# Patient Record
Sex: FEMALE | URBAN - NONMETROPOLITAN AREA
[De-identification: names, ages, dates, MRNs, and addresses within clinical notes are randomized per-mention and may not be internally consistent; named-entity substitution may affect disease eponyms.]

---

## 2018-11-20 ENCOUNTER — APPOINTMENT (RX ONLY)
Dept: URBAN - NONMETROPOLITAN AREA CLINIC 4 | Facility: CLINIC | Age: 63
Setting detail: DERMATOLOGY
End: 2018-11-20

## 2018-11-20 DIAGNOSIS — D22 MELANOCYTIC NEVI: ICD-10-CM

## 2018-11-20 DIAGNOSIS — D485 NEOPLASM OF UNCERTAIN BEHAVIOR OF SKIN: ICD-10-CM

## 2018-11-20 DIAGNOSIS — D18.0 HEMANGIOMA: ICD-10-CM

## 2018-11-20 DIAGNOSIS — B07.8 OTHER VIRAL WARTS: ICD-10-CM

## 2018-11-20 DIAGNOSIS — L81.4 OTHER MELANIN HYPERPIGMENTATION: ICD-10-CM

## 2018-11-20 DIAGNOSIS — L73.8 OTHER SPECIFIED FOLLICULAR DISORDERS: ICD-10-CM

## 2018-11-20 PROBLEM — I10 ESSENTIAL (PRIMARY) HYPERTENSION: Status: ACTIVE | Noted: 2018-11-20

## 2018-11-20 PROBLEM — D18.01 HEMANGIOMA OF SKIN AND SUBCUTANEOUS TISSUE: Status: ACTIVE | Noted: 2018-11-20

## 2018-11-20 PROBLEM — D48.5 NEOPLASM OF UNCERTAIN BEHAVIOR OF SKIN: Status: ACTIVE | Noted: 2018-11-20

## 2018-11-20 PROBLEM — D22.9 MELANOCYTIC NEVI, UNSPECIFIED: Status: ACTIVE | Noted: 2018-11-20

## 2018-11-20 PROBLEM — I25.10 ATHEROSCLEROTIC HEART DISEASE OF NATIVE CORONARY ARTERY WITHOUT ANGINA PECTORIS: Status: ACTIVE | Noted: 2018-11-20

## 2018-11-20 PROBLEM — M12.9 ARTHROPATHY, UNSPECIFIED: Status: ACTIVE | Noted: 2018-11-20

## 2018-11-20 PROCEDURE — 11100: CPT

## 2018-11-20 PROCEDURE — ? BIOPSY BY SHAVE METHOD

## 2018-11-20 PROCEDURE — ? COUNSELING

## 2018-11-20 PROCEDURE — 11101: CPT

## 2018-11-20 PROCEDURE — 99203 OFFICE O/P NEW LOW 30 MIN: CPT | Mod: 25

## 2018-11-20 ASSESSMENT — LOCATION SIMPLE DESCRIPTION DERM
LOCATION SIMPLE: RIGHT POSTERIOR THIGH
LOCATION SIMPLE: LEFT CHEEK
LOCATION SIMPLE: RIGHT POSTERIOR THIGH
LOCATION SIMPLE: LEFT CHEEK

## 2018-11-20 ASSESSMENT — LOCATION DETAILED DESCRIPTION DERM
LOCATION DETAILED: LEFT SUPERIOR MEDIAL BUCCAL CHEEK
LOCATION DETAILED: LEFT SUPERIOR MEDIAL BUCCAL CHEEK
LOCATION DETAILED: RIGHT PROXIMAL POSTERIOR THIGH
LOCATION DETAILED: RIGHT PROXIMAL POSTERIOR THIGH

## 2018-11-20 ASSESSMENT — LOCATION ZONE DERM
LOCATION ZONE: LEG
LOCATION ZONE: FACE
LOCATION ZONE: LEG
LOCATION ZONE: FACE

## 2018-11-20 NOTE — PROCEDURE: MIPS QUALITY
Quality 110: Preventive Care And Screening: Influenza Immunization: Influenza Immunization Administered during Influenza season
Quality 111:Pneumonia Vaccination Status For Older Adults: Pneumococcal Vaccination not Administered or Previously Received, Reason not Otherwise Specified
Quality 431: Preventive Care And Screening: Unhealthy Alcohol Use - Screening: Patient screened for unhealthy alcohol use using a single question and scores 2 or greater episodes per year and brief intervention did not occur
Quality 130: Documentation Of Current Medications In The Medical Record: Current Medications Documented
Detail Level: Detailed
Quality 226: Preventive Care And Screening: Tobacco Use: Screening And Cessation Intervention: Patient screened for tobacco use and is an ex/non-smoker

## 2018-11-20 NOTE — PROCEDURE: BIOPSY BY SHAVE METHOD
X Size Of Lesion In Cm: 0
Dressing: bandage
Body Location Override (Optional - Billing Will Still Be Based On Selected Body Map Location If Applicable): left nasolabial
Anesthesia Volume In Cc (Will Not Render If 0): 0.5
Render Post-Care Instructions In Note?: yes
Wound Care: Bacitracin
Silver Nitrate Text: The wound bed was treated with silver nitrate after the biopsy was performed.
Electrodesiccation And Curettage Text: The wound bed was treated with electrodesiccation and curettage after the biopsy was performed.
Hemostasis: Electrocautery
Consent: Written consent was obtained and risks were reviewed including but not limited to scarring, infection, bleeding, scabbing, incomplete removal, nerve damage and allergy to anesthesia.
Cryotherapy Text: The wound bed was treated with cryotherapy after the biopsy was performed.
Electrodesiccation Text: The wound bed was treated with electrodesiccation after the biopsy was performed.
Curettage Text: The wound bed was treated with curettage after the biopsy was performed.
Size Of Lesion In Cm: 0.6
Post-Care Instructions: I reviewed with the patient in detail post-care instructions. Patient is to keep the biopsy site dry overnight, and then apply bacitracin twice daily until healed. Patient may apply hydrogen peroxide soaks to remove any crusting.
Bill 20705 For Specimen Handling/Conveyance To Laboratory?: no
Biopsy Type: H and E
Detail Level: Detailed
Type Of Destruction Used: Curettage
Billing Type: [employer]
Biopsy Method: Dermablade
Lab Facility: [address] [place] [address]
Lab: [id number]
Depth Of Biopsy: dermis
Notification Instructions: Patient will be notified of biopsy results. However, patient instructed to call the office if not contacted within 2 weeks.
Path Notes (To The Dermatopathologist): Size: 0.6cm R/O: BCC vs SCC
Anesthesia Type: 1% lidocaine with epinephrine
Billing Type: Third-Party Bill
Lab Facility: [id number]
Body Location Override (Optional - Billing Will Still Be Based On Selected Body Map Location If Applicable): right posterior thigh
Lab: [id number]

## 2018-12-08 ENCOUNTER — APPOINTMENT (RX ONLY)
Dept: URBAN - NONMETROPOLITAN AREA CLINIC 4 | Facility: CLINIC | Age: 63
Setting detail: DERMATOLOGY
End: 2018-12-08

## 2018-12-08 DIAGNOSIS — L82.1 OTHER SEBORRHEIC KERATOSIS: ICD-10-CM

## 2018-12-08 DIAGNOSIS — L57.0 ACTINIC KERATOSIS: ICD-10-CM

## 2018-12-08 DIAGNOSIS — D22 MELANOCYTIC NEVI: ICD-10-CM

## 2018-12-08 PROBLEM — D22.9 MELANOCYTIC NEVI, UNSPECIFIED: Status: ACTIVE | Noted: 2018-12-08

## 2018-12-08 PROCEDURE — 17000 DESTRUCT PREMALG LESION: CPT | Mod: 59

## 2018-12-08 PROCEDURE — ? COUNSELING

## 2018-12-08 PROCEDURE — ? LIQUID NITROGEN

## 2018-12-08 PROCEDURE — 99214 OFFICE O/P EST MOD 30 MIN: CPT | Mod: 25

## 2018-12-08 PROCEDURE — 17110 DESTRUCTION B9 LES UP TO 14: CPT

## 2018-12-08 ASSESSMENT — LOCATION ZONE DERM
LOCATION ZONE: LEG
LOCATION ZONE: FACE
LOCATION ZONE: FACE
LOCATION ZONE: LEG

## 2018-12-08 ASSESSMENT — LOCATION SIMPLE DESCRIPTION DERM
LOCATION SIMPLE: LEFT CHEEK
LOCATION SIMPLE: LEFT CHEEK
LOCATION SIMPLE: RIGHT POSTERIOR THIGH
LOCATION SIMPLE: RIGHT POSTERIOR THIGH

## 2018-12-08 ASSESSMENT — LOCATION DETAILED DESCRIPTION DERM
LOCATION DETAILED: LEFT INFERIOR MEDIAL MALAR CHEEK
LOCATION DETAILED: LEFT INFERIOR MEDIAL MALAR CHEEK
LOCATION DETAILED: RIGHT DISTAL POSTERIOR THIGH
LOCATION DETAILED: RIGHT DISTAL POSTERIOR THIGH

## 2018-12-08 ASSESSMENT — PAIN INTENSITY VAS: HOW INTENSE IS YOUR PAIN 0 BEING NO PAIN, 10 BEING THE MOST SEVERE PAIN POSSIBLE?: NO PAIN

## 2018-12-08 ASSESSMENT — TOTAL NUMBER OF LESIONS: # OF LESIONS?: 1

## 2018-12-08 NOTE — PROCEDURE: MIPS QUALITY
Quality 226: Preventive Care And Screening: Tobacco Use: Screening And Cessation Intervention: Patient screened for tobacco use and is an ex/non-smoker
Quality 111:Pneumonia Vaccination Status For Older Adults: Pneumococcal Vaccination Previously Received
Quality 431: Preventive Care And Screening: Unhealthy Alcohol Use - Screening: Patient screened for unhealthy alcohol use using a single question and scores less than 2 times per year
Quality 110: Preventive Care And Screening: Influenza Immunization: Influenza Immunization Administered during Influenza season
Detail Level: Generalized

## 2018-12-08 NOTE — PROCEDURE: LIQUID NITROGEN
Detail Level: Zone
Render Post-Care Instructions In Note?: no
Duration Of Freeze Thaw-Cycle (Seconds): 3
Total Number Of Aks Treated: 1
Number Of Freeze-Thaw Cycles: 1 freeze-thaw cycle
Post-Care Instructions: I reviewed with the patient in detail post-care instructions. Patient is to wear sunprotection, and avoid picking at any of the treated lesions. Pt may apply Vaseline to crusted or scabbing areas.
Consent: The patient's consent was obtained including but not limited to risks of crusting, scabbing, blistering, scarring, darker or lighter pigmentary change, recurrence, incomplete removal and infection.
Medical Necessity Information: It is in your best interest to select a reason for this procedure from the list below. All of these items fulfill various CMS LCD requirements except the new and changing color options.
Total Number Of Lesions Treated: 5
Render Post Care In The Note?: yes
Medical Necessity Clause: This procedure was medically necessary because the lesions that were treated were: